# Patient Record
Sex: FEMALE | Race: WHITE | NOT HISPANIC OR LATINO
[De-identification: names, ages, dates, MRNs, and addresses within clinical notes are randomized per-mention and may not be internally consistent; named-entity substitution may affect disease eponyms.]

---

## 2021-02-16 ENCOUNTER — APPOINTMENT (OUTPATIENT)
Dept: ANTEPARTUM | Facility: CLINIC | Age: 21
End: 2021-02-16
Payer: MEDICAID

## 2021-02-16 ENCOUNTER — ASOB RESULT (OUTPATIENT)
Age: 21
End: 2021-02-16

## 2021-02-16 PROCEDURE — 99072 ADDL SUPL MATRL&STAF TM PHE: CPT

## 2021-02-16 PROCEDURE — 76811 OB US DETAILED SNGL FETUS: CPT

## 2021-04-25 ENCOUNTER — RESULT REVIEW (OUTPATIENT)
Age: 21
End: 2021-04-25

## 2021-04-25 ENCOUNTER — OUTPATIENT (OUTPATIENT)
Dept: OUTPATIENT SERVICES | Facility: HOSPITAL | Age: 21
LOS: 1 days | Discharge: HOME | End: 2021-04-25
Payer: MEDICAID

## 2021-04-25 VITALS
RESPIRATION RATE: 20 BRPM | DIASTOLIC BLOOD PRESSURE: 63 MMHG | TEMPERATURE: 98 F | HEART RATE: 92 BPM | SYSTOLIC BLOOD PRESSURE: 120 MMHG

## 2021-04-25 VITALS — SYSTOLIC BLOOD PRESSURE: 120 MMHG | DIASTOLIC BLOOD PRESSURE: 63 MMHG | HEART RATE: 120 BPM

## 2021-04-25 LAB
ALBUMIN SERPL ELPH-MCNC: 3.9 G/DL — SIGNIFICANT CHANGE UP (ref 3.5–5.2)
ALP SERPL-CCNC: 132 U/L — HIGH (ref 30–115)
ALT FLD-CCNC: 47 U/L — HIGH (ref 14–37)
AMYLASE P1 CFR SERPL: 67 U/L — SIGNIFICANT CHANGE UP (ref 25–115)
ANION GAP SERPL CALC-SCNC: 12 MMOL/L — SIGNIFICANT CHANGE UP (ref 7–14)
APPEARANCE UR: ABNORMAL
AST SERPL-CCNC: 28 U/L — SIGNIFICANT CHANGE UP (ref 14–37)
BACTERIA # UR AUTO: ABNORMAL
BASOPHILS # BLD AUTO: 0.02 K/UL — SIGNIFICANT CHANGE UP (ref 0–0.2)
BASOPHILS NFR BLD AUTO: 0.1 % — SIGNIFICANT CHANGE UP (ref 0–1)
BILIRUB SERPL-MCNC: 0.4 MG/DL — SIGNIFICANT CHANGE UP (ref 0.2–1.2)
BILIRUB UR-MCNC: NEGATIVE — SIGNIFICANT CHANGE UP
BLD GP AB SCN SERPL QL: SIGNIFICANT CHANGE UP
BUN SERPL-MCNC: 5 MG/DL — LOW (ref 10–20)
CALCIUM SERPL-MCNC: 9.3 MG/DL — SIGNIFICANT CHANGE UP (ref 8.5–10.1)
CHLORIDE SERPL-SCNC: 101 MMOL/L — SIGNIFICANT CHANGE UP (ref 98–110)
CO2 SERPL-SCNC: 21 MMOL/L — SIGNIFICANT CHANGE UP (ref 17–32)
COLOR SPEC: YELLOW — SIGNIFICANT CHANGE UP
CREAT SERPL-MCNC: 0.5 MG/DL — SIGNIFICANT CHANGE UP (ref 0.3–1)
DIFF PNL FLD: NEGATIVE — SIGNIFICANT CHANGE UP
EOSINOPHIL # BLD AUTO: 0 K/UL — SIGNIFICANT CHANGE UP (ref 0–0.7)
EOSINOPHIL NFR BLD AUTO: 0 % — SIGNIFICANT CHANGE UP (ref 0–8)
EPI CELLS # UR: >27 /HPF — HIGH (ref 0–5)
GLUCOSE SERPL-MCNC: 94 MG/DL — SIGNIFICANT CHANGE UP (ref 70–99)
GLUCOSE UR QL: ABNORMAL
HCT VFR BLD CALC: 33.1 % — LOW (ref 37–47)
HGB BLD-MCNC: 11.3 G/DL — LOW (ref 12–16)
HYALINE CASTS # UR AUTO: 19 /LPF — HIGH (ref 0–7)
IMM GRANULOCYTES NFR BLD AUTO: 0.5 % — HIGH (ref 0.1–0.3)
KETONES UR-MCNC: ABNORMAL
LEUKOCYTE ESTERASE UR-ACNC: NEGATIVE — SIGNIFICANT CHANGE UP
LIDOCAIN IGE QN: 18 U/L — SIGNIFICANT CHANGE UP (ref 7–60)
LYMPHOCYTES # BLD AUTO: 1.19 K/UL — LOW (ref 1.2–3.4)
LYMPHOCYTES # BLD AUTO: 7.9 % — LOW (ref 20.5–51.1)
MCHC RBC-ENTMCNC: 31.1 PG — HIGH (ref 27–31)
MCHC RBC-ENTMCNC: 34.1 G/DL — SIGNIFICANT CHANGE UP (ref 32–37)
MCV RBC AUTO: 91.2 FL — SIGNIFICANT CHANGE UP (ref 81–99)
MONOCYTES # BLD AUTO: 0.51 K/UL — SIGNIFICANT CHANGE UP (ref 0.1–0.6)
MONOCYTES NFR BLD AUTO: 3.4 % — SIGNIFICANT CHANGE UP (ref 1.7–9.3)
NEUTROPHILS # BLD AUTO: 13.33 K/UL — HIGH (ref 1.4–6.5)
NEUTROPHILS NFR BLD AUTO: 88.1 % — HIGH (ref 42.2–75.2)
NITRITE UR-MCNC: NEGATIVE — SIGNIFICANT CHANGE UP
NRBC # BLD: 0 /100 WBCS — SIGNIFICANT CHANGE UP (ref 0–0)
PH UR: 6.5 — SIGNIFICANT CHANGE UP (ref 5–8)
PLATELET # BLD AUTO: 267 K/UL — SIGNIFICANT CHANGE UP (ref 130–400)
POTASSIUM SERPL-MCNC: 3.8 MMOL/L — SIGNIFICANT CHANGE UP (ref 3.5–5)
POTASSIUM SERPL-SCNC: 3.8 MMOL/L — SIGNIFICANT CHANGE UP (ref 3.5–5)
PROT SERPL-MCNC: 6.8 G/DL — SIGNIFICANT CHANGE UP (ref 6.1–8)
PROT UR-MCNC: ABNORMAL
RBC # BLD: 3.63 M/UL — LOW (ref 4.2–5.4)
RBC # FLD: 12.5 % — SIGNIFICANT CHANGE UP (ref 11.5–14.5)
RBC CASTS # UR COMP ASSIST: 6 /HPF — HIGH (ref 0–4)
SODIUM SERPL-SCNC: 134 MMOL/L — LOW (ref 135–146)
SP GR SPEC: 1.03 — HIGH (ref 1.01–1.03)
UROBILINOGEN FLD QL: SIGNIFICANT CHANGE UP
WBC # BLD: 15.13 K/UL — HIGH (ref 4.8–10.8)
WBC # FLD AUTO: 15.13 K/UL — HIGH (ref 4.8–10.8)
WBC UR QL: 3 /HPF — SIGNIFICANT CHANGE UP (ref 0–5)

## 2021-04-25 PROCEDURE — 99213 OFFICE O/P EST LOW 20 MIN: CPT | Mod: 25

## 2021-04-25 PROCEDURE — 76705 ECHO EXAM OF ABDOMEN: CPT | Mod: 26

## 2021-04-25 PROCEDURE — 76818 FETAL BIOPHYS PROFILE W/NST: CPT | Mod: 26

## 2021-04-25 RX ORDER — SODIUM CHLORIDE 9 MG/ML
1000 INJECTION, SOLUTION INTRAVENOUS ONCE
Refills: 0 | Status: COMPLETED | OUTPATIENT
Start: 2021-04-25 | End: 2021-04-25

## 2021-04-25 RX ORDER — INDOMETHACIN 50 MG
50 CAPSULE ORAL ONCE
Refills: 0 | Status: COMPLETED | OUTPATIENT
Start: 2021-04-25 | End: 2021-04-25

## 2021-04-25 RX ORDER — INDOMETHACIN 50 MG
1 CAPSULE ORAL
Qty: 8 | Refills: 0
Start: 2021-04-25 | End: 2021-04-26

## 2021-04-25 RX ORDER — FAMOTIDINE 10 MG/ML
20 INJECTION INTRAVENOUS ONCE
Refills: 0 | Status: COMPLETED | OUTPATIENT
Start: 2021-04-25 | End: 2021-04-25

## 2021-04-25 RX ORDER — ACETAMINOPHEN 500 MG
1000 TABLET ORAL ONCE
Refills: 0 | Status: COMPLETED | OUTPATIENT
Start: 2021-04-25 | End: 2021-04-25

## 2021-04-25 RX ADMIN — Medication 50 MILLIGRAM(S): at 15:16

## 2021-04-25 RX ADMIN — Medication 400 MILLIGRAM(S): at 13:19

## 2021-04-25 RX ADMIN — FAMOTIDINE 20 MILLIGRAM(S): 10 INJECTION INTRAVENOUS at 10:17

## 2021-04-25 RX ADMIN — SODIUM CHLORIDE 1000 MILLILITER(S): 9 INJECTION, SOLUTION INTRAVENOUS at 10:30

## 2021-04-25 NOTE — PROGRESS NOTE ADULT - ASSESSMENT
21yo  @30w2d, GBS pos bacteruria, with abdominal pain likely secondary to cholelithiasis, not in   labor, BPP 10/10, currently clinically and hemodynamically stable.  -will give indomethicin 50mg PO for pain management  -if pain is resolved, can d/c home with indomethicin 25mg x2 days  -regular diet  -cont efm/toco  -f/u ucx    Dr. Crum and Dr. Reza aware.

## 2021-04-25 NOTE — OB PROVIDER TRIAGE NOTE - NSHPLABSRESULTS_GEN_ALL_CORE
Labs:  11/12  ucx GBS pos, E faecalis pos  GC/CT neg  HBsAg NR  RPR NR  Measles immune  varicella immune  rubella immune  mumps immune  A pos, antibody neg  HIV NR    12/09  ucx GBS pos    4/10  GCT 86    Sono:  2/16: 20w3d, EFW 376g (65%), cephalic, post placenta, CL 3.61cm, normal anatomy

## 2021-04-25 NOTE — OB PROVIDER TRIAGE NOTE - NSANTENATALSTERA_OBGYN_ALL_OB
Detail Level: Zone
Include Location In Plan?: No
Detail Level: Simple
Not applicable as gestational age is greater than or equal to 34 weeks.

## 2021-04-25 NOTE — CHART NOTE - NSCHARTNOTEFT_GEN_A_CORE
Patient re-evaluated at bedside after indomethicin.  Pain is slightly improved.  Denies contractions, vaginal bleeding, LOF.  Good fetal movement.  Desires d/c home.    EFM:v 140/mod/+accel  Rosa Sanchez: none  SVE: 1/0/-3 @1404    A/P: 21yo  @30w2d, GBS pos bacteruria, with abdominal pain likely secondary to cholelithiasis, not in   labor, BPP 10/10, maternal and fetal wellbeing reassuring.  -home with indomethicin 25mg q6h x 2days  - labor precautions discussed  -PO hydration encouraged  -continue home prilosec and colace  -pain management with tylenol prn  -follow up with PMD this week    Dr. Crum and Dr. Reza aware.

## 2021-04-25 NOTE — OB PROVIDER TRIAGE NOTE - HISTORY OF PRESENT ILLNESS
19yo  @30w2d with COLLETTE / by LMP  consistent with first trimester sonogram per patient presenting to L&D for periumbilical and epigastric pain since yesterday @1600.  Pain was sudden onset, nonradiating, constant and sharp. 21yo  @30w2d with COLLETTE 7/2 by first trimester sonogram presenting to L&D for periumbilical and epigastric pain since yesterday @1600.  Pain was sudden onset, nonradiating, constant and sharp. Not relieved with tylenol or prilosec at home.  Reports 1 episode of vomiting around 2200 yesterday, currently denies nausea.  Has a history of constipation on colace, denies any recent diarrhea.  Denies sick contacts. Denies fevers, chills, cramping, vaginal discharge, dysuria, hematuria, leg pain/swelling.  Denies vaginal bleeding, leakage of fluid, contractions.  Good fetal movement.  Denies any complications with this pregnancy. GBS pos bacteruria. 19yo  @30w2d with COLLETTE 7/2 by first trimester sonogram presenting to L&D for periumbilical and epigastric pain since yesterday @1600.  Pain was sudden onset, nonradiating, constant and sharp. Not relieved with tylenol or prilosec at home.  Reports 1 episode of vomiting around 2200 yesterday, currently denies nausea.  Has a history of constipation on colace, denies any recent diarrhea.  Denies sick contacts. Denies fevers, chills, cramping, vaginal discharge, dysuria, hematuria, leg pain/swelling.  Denies vaginal bleeding, leakage of fluid, contractions.  Good fetal movement.  Denies any complications with this pregnancy. Last PO intake yesterday @, last BM this AM, last intercourse last week. GBS pos bacteruria.

## 2021-04-25 NOTE — OB PROVIDER TRIAGE NOTE - NSOBPROVIDERNOTE_OBGYN_ALL_OB_FT
19yo  @30w2d, GBS pos bacteruria, with abdominal pain r/o cholelithiasis vs. pancreatitis vs.  labor, BPP 10/10, currently clinically stable.  -cont efm/toco  -clear liquid diet  -pepcid 20mg IVP  -f/u upper abdomen ultrasound  -f/u CBC, CMP, lipase, amylase, RPR, HIV, T&S  -f/u UA, ucx    Dr. Crum and Dr. Reza aware.

## 2021-04-25 NOTE — PROGRESS NOTE ADULT - SUBJECTIVE AND OBJECTIVE BOX
PGY 2 Note    Patient seen at bedside for evaluation.  Reports no change in pain with IV tylenol or pepcid.  Patient examined at @1200 by Dr. Reza, SVE /-3 at that time.  Denies contractions or cramping, continues to report epigastric pain.  No other complaints.    Vital Signs Last 24 Hrs  T(C): 36.6 (2021 12:06), Max: 36.9 (2021 11:18)  T(F): 97.88 (2021 12:06), Max: 98.42 (2021 11:18)  HR: 92 (2021 12:06) (90 - 120)  BP: 120/63 (2021 12:06) (111/61 - 126/58)  RR: 20 (2021 12:06) (20 - 20)    EFM: 145/mod/+accel  TOCO: irregular  SVE: 1/-3 @1404    Labs:                        11.3   15.13 )-----------( 267      ( 2021 10:01 )             33.1         134<L>  |  101  |  5<L>  ----------------------------<  94  3.8   |  21  |  0.5    Ca    9.3      2021 10:01    TPro  6.8  /  Alb  3.9  /  TBili  0.4  /  DBili  x   /  AST  28  /  ALT  47<H>  /  AlkPhos  132<H>      ABO RH Interpretation: A POS (21 @ 10:01)    Urinalysis Basic - ( 2021 11:20 )    Color: Yellow / Appearance: Slightly Turbid / S.034 / pH: x  Gluc: x / Ketone: Large  / Bili: Negative / Urobili: <2 mg/dL   Blood: x / Protein: 100 mg/dL / Nitrite: Negative   Leuk Esterase: Negative / RBC: 6 /HPF / WBC 3 /HPF   Sq Epi: x / Non Sq Epi: >27 /HPF / Bacteria: Few    Amylase, Serum Total (21 @ 10:01)    Amylase, Serum Total: 67 U/L    Lipase, Serum (21 @ 10:01)    Lipase, Serum: 18 U/L    Imaging:  < from: US Abdomen Limited (21 @ 11:15) >    EXAM:  US ABDOMEN LIMITED            PROCEDURE DATE:  2021            INTERPRETATION:  CLINICAL HISTORY / REASON FOR EXAM: Epigastric and periumbilical pain. Pregnant.    COMPARISON: None.    PROCEDURE: Ultrasound of the right upper quadrant was performed.    FINDINGS:    LIVER: Normal in contour and echogenicity measuring 16 cm in length.    GALLBLADDER/BILIARY TREE: Cholelithiasis and sludge. No wall thickening or pericholecystic fluid. Negative sonographic Obrien's sign. No intrahepatic biliary ductal dilatation. The common bile duct measures 2 mm, which is normal.    PANCREAS: The visualized portions are unremarkable. Remainder obscured by overlying bowel gas. Pancreatitis cannot be excluded on this imaging exam    KIDNEY: Measures 10 cm in length. Mild right hydronephrosis.    URINARY BLADDER: Contracted.    AORTA/IVC: Visualized proximal portions unremarkable.    ASCITES: None.    IMPRESSION:    Cholelithiasis and sludge. No evidence of acute cholecystitis.    Mild right hydronephrosis.    < end of copied text >          MEDICATIONS  (STANDING):  indomethacin 50 milliGRAM(s) Oral once    MEDICATIONS  (PRN):

## 2021-04-25 NOTE — OB PROVIDER TRIAGE NOTE - NSHPPHYSICALEXAM_GEN_ALL_CORE
Vital Signs Last 24 Hrs  T(C): 36.4 (25 Apr 2021 09:15), Max: 36.4 (25 Apr 2021 09:15)  T(F): 97.6 (25 Apr 2021 09:15), Max: 97.6 (25 Apr 2021 09:15)  HR: 90 (25 Apr 2021 10:08) (90 - 120) bedside manual pulse 88bpm  BP: 111/61 (25 Apr 2021 10:08) (111/61 - 120/63)  RR: 20 (25 Apr 2021 09:15) (20 - 20)    Gen: NAD, sitting comfortably  Abd: Mild periumbilical tenderness, gravid, soft, no palpable ctx, no suprapubic tenderness, no CVA tenderness  SVE: 0/0/-3, vtx, intact  EFM: 140/mod/+accels  Pillager: q5m  Sono: cephalic, post placenta, CL 3.61cm, BPP 8/8, MVP 7.78cm, EFW 1657 (58%)

## 2021-04-27 LAB
CULTURE RESULTS: SIGNIFICANT CHANGE UP
SPECIMEN SOURCE: SIGNIFICANT CHANGE UP

## 2021-06-21 VITALS — SYSTOLIC BLOOD PRESSURE: 124 MMHG | DIASTOLIC BLOOD PRESSURE: 76 MMHG | WEIGHT: 170 LBS

## 2021-06-27 ENCOUNTER — INPATIENT (INPATIENT)
Facility: HOSPITAL | Age: 21
LOS: 2 days | Discharge: HOME | End: 2021-06-30
Attending: OBSTETRICS & GYNECOLOGY | Admitting: OBSTETRICS & GYNECOLOGY
Payer: MEDICAID

## 2021-06-27 VITALS
DIASTOLIC BLOOD PRESSURE: 60 MMHG | RESPIRATION RATE: 20 BRPM | HEIGHT: 63 IN | WEIGHT: 164.91 LBS | HEART RATE: 67 BPM | TEMPERATURE: 99 F | SYSTOLIC BLOOD PRESSURE: 137 MMHG

## 2021-06-27 LAB
ALBUMIN SERPL ELPH-MCNC: 4 G/DL — SIGNIFICANT CHANGE UP (ref 3.5–5.2)
ALP SERPL-CCNC: 254 U/L — HIGH (ref 30–115)
ALT FLD-CCNC: 114 U/L — HIGH (ref 14–37)
ANION GAP SERPL CALC-SCNC: 12 MMOL/L — SIGNIFICANT CHANGE UP (ref 7–14)
APPEARANCE UR: CLEAR — SIGNIFICANT CHANGE UP
AST SERPL-CCNC: 78 U/L — HIGH (ref 14–37)
BACTERIA # UR AUTO: ABNORMAL
BASOPHILS # BLD AUTO: 0.02 K/UL — SIGNIFICANT CHANGE UP (ref 0–0.2)
BASOPHILS NFR BLD AUTO: 0.2 % — SIGNIFICANT CHANGE UP (ref 0–1)
BILIRUB SERPL-MCNC: 0.3 MG/DL — SIGNIFICANT CHANGE UP (ref 0.2–1.2)
BILIRUB UR-MCNC: NEGATIVE — SIGNIFICANT CHANGE UP
BLD GP AB SCN SERPL QL: SIGNIFICANT CHANGE UP
BUN SERPL-MCNC: 10 MG/DL — SIGNIFICANT CHANGE UP (ref 10–20)
CALCIUM SERPL-MCNC: 9 MG/DL — SIGNIFICANT CHANGE UP (ref 8.5–10.1)
CHLORIDE SERPL-SCNC: 106 MMOL/L — SIGNIFICANT CHANGE UP (ref 98–110)
CO2 SERPL-SCNC: 22 MMOL/L — SIGNIFICANT CHANGE UP (ref 17–32)
COLOR SPEC: YELLOW — SIGNIFICANT CHANGE UP
CREAT SERPL-MCNC: 0.6 MG/DL — LOW (ref 0.7–1.5)
DIFF PNL FLD: NEGATIVE — SIGNIFICANT CHANGE UP
EOSINOPHIL # BLD AUTO: 0.01 K/UL — SIGNIFICANT CHANGE UP (ref 0–0.7)
EOSINOPHIL NFR BLD AUTO: 0.1 % — SIGNIFICANT CHANGE UP (ref 0–8)
EPI CELLS # UR: 8 /HPF — HIGH (ref 0–5)
GLUCOSE SERPL-MCNC: 80 MG/DL — SIGNIFICANT CHANGE UP (ref 70–99)
GLUCOSE UR QL: NEGATIVE — SIGNIFICANT CHANGE UP
HCT VFR BLD CALC: 38 % — SIGNIFICANT CHANGE UP (ref 37–47)
HGB BLD-MCNC: 13 G/DL — SIGNIFICANT CHANGE UP (ref 12–16)
HYALINE CASTS # UR AUTO: 2 /LPF — SIGNIFICANT CHANGE UP (ref 0–7)
IMM GRANULOCYTES NFR BLD AUTO: 0.3 % — SIGNIFICANT CHANGE UP (ref 0.1–0.3)
KETONES UR-MCNC: NEGATIVE — SIGNIFICANT CHANGE UP
LEUKOCYTE ESTERASE UR-ACNC: ABNORMAL
LYMPHOCYTES # BLD AUTO: 2.38 K/UL — SIGNIFICANT CHANGE UP (ref 1.2–3.4)
LYMPHOCYTES # BLD AUTO: 27.3 % — SIGNIFICANT CHANGE UP (ref 20.5–51.1)
MCHC RBC-ENTMCNC: 31.3 PG — HIGH (ref 27–31)
MCHC RBC-ENTMCNC: 34.2 G/DL — SIGNIFICANT CHANGE UP (ref 32–37)
MCV RBC AUTO: 91.6 FL — SIGNIFICANT CHANGE UP (ref 81–99)
MONOCYTES # BLD AUTO: 0.51 K/UL — SIGNIFICANT CHANGE UP (ref 0.1–0.6)
MONOCYTES NFR BLD AUTO: 5.8 % — SIGNIFICANT CHANGE UP (ref 1.7–9.3)
NEUTROPHILS # BLD AUTO: 5.77 K/UL — SIGNIFICANT CHANGE UP (ref 1.4–6.5)
NEUTROPHILS NFR BLD AUTO: 66.3 % — SIGNIFICANT CHANGE UP (ref 42.2–75.2)
NITRITE UR-MCNC: NEGATIVE — SIGNIFICANT CHANGE UP
NRBC # BLD: 0 /100 WBCS — SIGNIFICANT CHANGE UP (ref 0–0)
PH UR: 6 — SIGNIFICANT CHANGE UP (ref 5–8)
PLATELET # BLD AUTO: 199 K/UL — SIGNIFICANT CHANGE UP (ref 130–400)
POTASSIUM SERPL-MCNC: 4.3 MMOL/L — SIGNIFICANT CHANGE UP (ref 3.5–5)
POTASSIUM SERPL-SCNC: 4.3 MMOL/L — SIGNIFICANT CHANGE UP (ref 3.5–5)
PRENATAL SYPHILIS TEST: SIGNIFICANT CHANGE UP
PROT SERPL-MCNC: 6.4 G/DL — SIGNIFICANT CHANGE UP (ref 6–8)
PROT UR-MCNC: SIGNIFICANT CHANGE UP
RBC # BLD: 4.15 M/UL — LOW (ref 4.2–5.4)
RBC # FLD: 13.5 % — SIGNIFICANT CHANGE UP (ref 11.5–14.5)
RBC CASTS # UR COMP ASSIST: 1 /HPF — SIGNIFICANT CHANGE UP (ref 0–4)
SODIUM SERPL-SCNC: 140 MMOL/L — SIGNIFICANT CHANGE UP (ref 135–146)
SP GR SPEC: 1.02 — SIGNIFICANT CHANGE UP (ref 1.01–1.03)
UROBILINOGEN FLD QL: SIGNIFICANT CHANGE UP
WBC # BLD: 8.72 K/UL — SIGNIFICANT CHANGE UP (ref 4.8–10.8)
WBC # FLD AUTO: 8.72 K/UL — SIGNIFICANT CHANGE UP (ref 4.8–10.8)
WBC UR QL: 5 /HPF — SIGNIFICANT CHANGE UP (ref 0–5)

## 2021-06-27 RX ORDER — OXYTOCIN 10 UNIT/ML
333.33 VIAL (ML) INJECTION
Qty: 20 | Refills: 0 | Status: DISCONTINUED | OUTPATIENT
Start: 2021-06-27 | End: 2021-06-28

## 2021-06-27 RX ORDER — AMPICILLIN TRIHYDRATE 250 MG
1 CAPSULE ORAL EVERY 4 HOURS
Refills: 0 | Status: DISCONTINUED | OUTPATIENT
Start: 2021-06-27 | End: 2021-06-28

## 2021-06-27 RX ORDER — AMPICILLIN TRIHYDRATE 250 MG
2 CAPSULE ORAL ONCE
Refills: 0 | Status: COMPLETED | OUTPATIENT
Start: 2021-06-27 | End: 2021-06-27

## 2021-06-27 RX ORDER — SODIUM CHLORIDE 9 MG/ML
1000 INJECTION, SOLUTION INTRAVENOUS
Refills: 0 | Status: DISCONTINUED | OUTPATIENT
Start: 2021-06-27 | End: 2021-06-28

## 2021-06-27 RX ADMIN — Medication 216 GRAM(S): at 22:55

## 2021-06-27 NOTE — OB PROVIDER H&P - NSHPPHYSICALEXAM_GEN_ALL_CORE
T(C): 37 (06-27-21 @ 21:19), Max: 37 (06-27-21 @ 21:19)  HR: 81 (06-27-21 @ 22:55) (67 - 93)  BP: 124/66 (06-27-21 @ 22:55) (112/63 - 137/60)  RR: 20 (06-27-21 @ 21:19) (20 - 20)  BMI (kg/m2): 29.2 (06-27-21 @ 21:19)    Gen: A+OX3. NAD  Abd: Soft, Nontender. Gravid.  FHR: 140BPM/mod cristi/accels pos  TOCO: none  SVE:     EFW by Leopolds: T(C): 37 (06-27-21 @ 21:19), Max: 37 (06-27-21 @ 21:19)  HR: 81 (06-27-21 @ 22:55) (67 - 93)  BP: 124/66 (06-27-21 @ 22:55) (112/63 - 137/60)  RR: 20 (06-27-21 @ 21:19) (20 - 20)  BMI (kg/m2): 29.2 (06-27-21 @ 21:19)    Gen: A+OX3. NAD  Abd: Soft, Nontender. Gravid.  FHR: 140BPM/mod cristi/accels pos  TOCO: none  SVE: 1/60 vtx, intact per Dr. Clark    EFW by Leopolds: [id number] T(C): 37 (06-27-21 @ 21:19), Max: 37 (06-27-21 @ 21:19)  HR: 81 (06-27-21 @ 22:55) (67 - 93)  BP: 124/66 (06-27-21 @ 22:55) (112/63 - 137/60)  RR: 20 (06-27-21 @ 21:19) (20 - 20)  BMI (kg/m2): 29.2 (06-27-21 @ 21:19)    Gen: A+OX3. NAD  Abd: Soft, Nontender. Gravid.  FHR: 140BPM/mod cristi/accels pos  TOCO: none  SVE: 1/80/-3, vertex intact     EFW by Leopolds: 2800

## 2021-06-27 NOTE — OB PROVIDER H&P - HISTORY OF PRESENT ILLNESS
20y/o,  at 39w2d, date by LMP 20 COLLETTE 2021 , GBS pos, presents IOL due to elevated bile acids.     Denies vaginal bleeding, LOF, and CTX. Feels good fetal movements. No other complications this pregnancy.     22y/o,  at 39w2d, date by LMP 20 COLLETTE 2021 , GBS pos, presents IOL at term, with elevated bile acids. Total bile acids 14 on 2021.   Last SVE on , 1cm.     Denies vaginal bleeding, LOF, and CTX. Feels good fetal movements. No other complications this pregnancy.     22y/o,  at 39w2d, date by LMP 20 COLLETTE 2021 , GBS pos, presents IOL at term, complicated by cholestasis. Total bile acids 14 on 2021.   Last SVE on , 1cm.     Denies vaginal bleeding, LOF, and CTX. Feels good fetal movements. No other complications this pregnancy.     20y/o,  at 39w2d, date by LMP 20 COLLETTE 2021 , GBS pos, presents IOL at term, complicated by cholestasis. Total bile acids 14 on 2021.   Cholelithasis this pregnancy, passed CBD stone. Last SVE on , 1cm.     Denies vaginal bleeding, LOF, and CTX. Feels good fetal movements. No other complications this pregnancy.

## 2021-06-27 NOTE — OB PROVIDER H&P - ATTENDING COMMENTS
Pt seen and examined, Pt is a 20yo  at 39w2d, GBS bacteruria, with cholestasis, for IOL at term  -admit   -iv access  -iv abx for gbs prophylaxis   -Cervical balloon placed.   -analgesia prn

## 2021-06-27 NOTE — OB PROVIDER H&P - NSHPLABSRESULTS_GEN_ALL_CORE
Type and Screen: A pos  Antibody screen: neg   Rubella: immune  Varicella: immune   RPR: neg  HbSAg: neg  HIV: NR  Gonorrhea: neg   Chlamydia: neg    Second Trimester:  1 hour Glucola:  86    Third Trimester:  HIV: NR (6/2)    Urine Cx: pos for GBS (11/12/2020)    Sonogram:    20w3d posterior placenta, AFV normal, normal anatomy, , 65%  34w5d vtx, MVP 5.7,  BPP 8/8 Type and Screen: A pos  Antibody screen: neg   Rubella: immune  Varicella: immune   RPR: neg  HbSAg: neg  HIV: NR  Gonorrhea: neg   Chlamydia: neg    Second Trimester:  1 hour Glucola:  86    Third Trimester:  HIV: NR (6/2)    Urine Cx: pos for GBS (11/12/2020)    Bile acids 6/1  cholic acid 10.2 H  deoxycholic acid <0.5  chenodeoxycholic acid 4.1 H  total bile acids 14.3 H  Sonogram:    20w3d posterior placenta, AFV normal, normal anatomy, , 65%  34w5d vtx, MVP 5.7,  BPP 8/8 Type and Screen: A pos  Antibody screen: neg   Rubella: immune  Varicella: immune   RPR: neg  HbSAg: neg  HIV: NR  Gonorrhea: neg   Chlamydia: neg    Second Trimester:  1 hour Glucola:  86    Third Trimester:  HIV: NR (6/2)    Urine Cx: pos for GBS (11/12/2020)    Bile acids 6/1 total bile acids 14.3 H  AST/ALT 66/125    Sonogram:  20w3d posterior placenta, AFV normal, normal anatomy, , 65%  34w5d vtx, MVP 5.7,  BPP 8/8

## 2021-06-27 NOTE — OB PROVIDER H&P - ASSESSMENT
20yo  at 39w2d, GBS pos, for IOL due to elevated bile acids.  -Admit to L+D  -Monitor EFM and TOCO   -IVF and labs  -Pain control PRN  -Clear liquid diet as tolerated  -Pitocin for labor induction      Case discussed with Dr. Gleason, and Dr. Clark to be aware 20yo  at 39w2d, GBS bacteruria, with cholestasis, for IOL at term,  -Admit to L+D  -Monitor EFM and TOCO   -IVF and labs  -Pain control PRN  -Clear liquid diet as tolerated  -Pitocin for labor induction    Case discussed with Dr. Gleason, and Dr. Clark to be aware 22yo  at 39w2d, GBS bacteruria, with cholestasis, for IOL at term  -f/u CMP   -Admit to L+D  -Monitor EFM and TOCO   -IVF and labs  -Pain control PRN  -Clear liquid diet as tolerated  -Pitocin for labor induction    Case discussed with Dr. Gleason, and Dr. Clark to be aware

## 2021-06-28 PROBLEM — Z78.9 OTHER SPECIFIED HEALTH STATUS: Chronic | Status: ACTIVE | Noted: 2021-04-25

## 2021-06-28 LAB
AMPHET UR-MCNC: NEGATIVE — SIGNIFICANT CHANGE UP
BARBITURATES UR SCN-MCNC: NEGATIVE — SIGNIFICANT CHANGE UP
BENZODIAZ UR-MCNC: NEGATIVE — SIGNIFICANT CHANGE UP
BUPRENORPHINE SCREEN, URINE RESULT: NEGATIVE — SIGNIFICANT CHANGE UP
COCAINE METAB.OTHER UR-MCNC: NEGATIVE — SIGNIFICANT CHANGE UP
COVID-19 SPIKE DOMAIN AB INTERP: POSITIVE
COVID-19 SPIKE DOMAIN ANTIBODY RESULT: 245 U/ML — HIGH
FENTANYL UR QL: NEGATIVE — SIGNIFICANT CHANGE UP
L&D DRUG SCREEN, URINE: SIGNIFICANT CHANGE UP
METHADONE UR-MCNC: NEGATIVE — SIGNIFICANT CHANGE UP
OPIATES UR-MCNC: NEGATIVE — SIGNIFICANT CHANGE UP
OXYCODONE UR-MCNC: NEGATIVE — SIGNIFICANT CHANGE UP
PCP UR-MCNC: NEGATIVE — SIGNIFICANT CHANGE UP
PROPOXYPHENE QUALITATIVE URINE RESULT: NEGATIVE — SIGNIFICANT CHANGE UP
SARS-COV-2 IGG+IGM SERPL QL IA: 245 U/ML — HIGH
SARS-COV-2 IGG+IGM SERPL QL IA: POSITIVE
SARS-COV-2 RNA SPEC QL NAA+PROBE: SIGNIFICANT CHANGE UP

## 2021-06-28 PROCEDURE — 59400 OBSTETRICAL CARE: CPT | Mod: U9

## 2021-06-28 RX ORDER — SIMETHICONE 80 MG/1
80 TABLET, CHEWABLE ORAL EVERY 4 HOURS
Refills: 0 | Status: DISCONTINUED | OUTPATIENT
Start: 2021-06-28 | End: 2021-06-30

## 2021-06-28 RX ORDER — IBUPROFEN 200 MG
600 TABLET ORAL EVERY 6 HOURS
Refills: 0 | Status: DISCONTINUED | OUTPATIENT
Start: 2021-06-28 | End: 2021-06-30

## 2021-06-28 RX ORDER — OXYCODONE HYDROCHLORIDE 5 MG/1
5 TABLET ORAL ONCE
Refills: 0 | Status: DISCONTINUED | OUTPATIENT
Start: 2021-06-28 | End: 2021-06-30

## 2021-06-28 RX ORDER — IBUPROFEN 200 MG
600 TABLET ORAL EVERY 6 HOURS
Refills: 0 | Status: COMPLETED | OUTPATIENT
Start: 2021-06-28 | End: 2022-05-27

## 2021-06-28 RX ORDER — SODIUM CHLORIDE 9 MG/ML
3 INJECTION INTRAMUSCULAR; INTRAVENOUS; SUBCUTANEOUS EVERY 8 HOURS
Refills: 0 | Status: DISCONTINUED | OUTPATIENT
Start: 2021-06-28 | End: 2021-06-30

## 2021-06-28 RX ORDER — CARBOPROST TROMETHAMINE 250 UG/ML
250 INJECTION, SOLUTION INTRAMUSCULAR ONCE
Refills: 0 | Status: COMPLETED | OUTPATIENT
Start: 2021-06-28 | End: 2021-06-28

## 2021-06-28 RX ORDER — ONDANSETRON 8 MG/1
4 TABLET, FILM COATED ORAL EVERY 6 HOURS
Refills: 0 | Status: DISCONTINUED | OUTPATIENT
Start: 2021-06-28 | End: 2021-06-28

## 2021-06-28 RX ORDER — ACETAMINOPHEN 500 MG
975 TABLET ORAL
Refills: 0 | Status: DISCONTINUED | OUTPATIENT
Start: 2021-06-28 | End: 2021-06-30

## 2021-06-28 RX ORDER — BENZOCAINE 10 %
1 GEL (GRAM) MUCOUS MEMBRANE EVERY 6 HOURS
Refills: 0 | Status: DISCONTINUED | OUTPATIENT
Start: 2021-06-28 | End: 2021-06-30

## 2021-06-28 RX ORDER — OXYCODONE HYDROCHLORIDE 5 MG/1
5 TABLET ORAL
Refills: 0 | Status: DISCONTINUED | OUTPATIENT
Start: 2021-06-28 | End: 2021-06-30

## 2021-06-28 RX ORDER — FENTANYL/BUPIVACAINE/NS/PF 2MCG/ML-.1
250 PLASTIC BAG, INJECTION (ML) INJECTION
Refills: 0 | Status: DISCONTINUED | OUTPATIENT
Start: 2021-06-28 | End: 2021-06-28

## 2021-06-28 RX ORDER — DIPHENHYDRAMINE HCL 50 MG
25 CAPSULE ORAL EVERY 6 HOURS
Refills: 0 | Status: DISCONTINUED | OUTPATIENT
Start: 2021-06-28 | End: 2021-06-30

## 2021-06-28 RX ORDER — AER TRAVELER 0.5 G/1
1 SOLUTION RECTAL; TOPICAL EVERY 4 HOURS
Refills: 0 | Status: DISCONTINUED | OUTPATIENT
Start: 2021-06-28 | End: 2021-06-30

## 2021-06-28 RX ORDER — NALOXONE HYDROCHLORIDE 4 MG/.1ML
0.1 SPRAY NASAL
Refills: 0 | Status: DISCONTINUED | OUTPATIENT
Start: 2021-06-28 | End: 2021-06-28

## 2021-06-28 RX ORDER — OXYTOCIN 10 UNIT/ML
333.33 VIAL (ML) INJECTION
Qty: 20 | Refills: 0 | Status: DISCONTINUED | OUTPATIENT
Start: 2021-06-28 | End: 2021-06-30

## 2021-06-28 RX ORDER — KETOROLAC TROMETHAMINE 30 MG/ML
30 SYRINGE (ML) INJECTION ONCE
Refills: 0 | Status: DISCONTINUED | OUTPATIENT
Start: 2021-06-28 | End: 2021-06-30

## 2021-06-28 RX ORDER — LANOLIN
1 OINTMENT (GRAM) TOPICAL EVERY 6 HOURS
Refills: 0 | Status: DISCONTINUED | OUTPATIENT
Start: 2021-06-28 | End: 2021-06-30

## 2021-06-28 RX ORDER — OXYTOCIN 10 UNIT/ML
2 VIAL (ML) INJECTION
Qty: 30 | Refills: 0 | Status: DISCONTINUED | OUTPATIENT
Start: 2021-06-28 | End: 2021-06-28

## 2021-06-28 RX ORDER — DIBUCAINE 1 %
1 OINTMENT (GRAM) RECTAL EVERY 6 HOURS
Refills: 0 | Status: DISCONTINUED | OUTPATIENT
Start: 2021-06-28 | End: 2021-06-30

## 2021-06-28 RX ORDER — TETANUS TOXOID, REDUCED DIPHTHERIA TOXOID AND ACELLULAR PERTUSSIS VACCINE, ADSORBED 5; 2.5; 8; 8; 2.5 [IU]/.5ML; [IU]/.5ML; UG/.5ML; UG/.5ML; UG/.5ML
0.5 SUSPENSION INTRAMUSCULAR ONCE
Refills: 0 | Status: DISCONTINUED | OUTPATIENT
Start: 2021-06-28 | End: 2021-06-30

## 2021-06-28 RX ORDER — DEXAMETHASONE 0.5 MG/5ML
4 ELIXIR ORAL EVERY 6 HOURS
Refills: 0 | Status: DISCONTINUED | OUTPATIENT
Start: 2021-06-28 | End: 2021-06-28

## 2021-06-28 RX ORDER — HYDROCORTISONE 1 %
1 OINTMENT (GRAM) TOPICAL EVERY 6 HOURS
Refills: 0 | Status: DISCONTINUED | OUTPATIENT
Start: 2021-06-28 | End: 2021-06-30

## 2021-06-28 RX ORDER — MAGNESIUM HYDROXIDE 400 MG/1
30 TABLET, CHEWABLE ORAL
Refills: 0 | Status: DISCONTINUED | OUTPATIENT
Start: 2021-06-28 | End: 2021-06-30

## 2021-06-28 RX ORDER — PRAMOXINE HYDROCHLORIDE 150 MG/15G
1 AEROSOL, FOAM RECTAL EVERY 4 HOURS
Refills: 0 | Status: DISCONTINUED | OUTPATIENT
Start: 2021-06-28 | End: 2021-06-30

## 2021-06-28 RX ADMIN — Medication 108 GRAM(S): at 02:30

## 2021-06-28 RX ADMIN — Medication 108 GRAM(S): at 18:30

## 2021-06-28 RX ADMIN — Medication 600 MILLIGRAM(S): at 22:55

## 2021-06-28 RX ADMIN — SODIUM CHLORIDE 3 MILLILITER(S): 9 INJECTION INTRAMUSCULAR; INTRAVENOUS; SUBCUTANEOUS at 23:36

## 2021-06-28 RX ADMIN — Medication 108 GRAM(S): at 10:27

## 2021-06-28 RX ADMIN — Medication 2 MILLIUNIT(S)/MIN: at 07:35

## 2021-06-28 RX ADMIN — Medication 108 GRAM(S): at 06:30

## 2021-06-28 RX ADMIN — Medication 600 MILLIGRAM(S): at 22:23

## 2021-06-28 RX ADMIN — CARBOPROST TROMETHAMINE 250 MICROGRAM(S): 250 INJECTION, SOLUTION INTRAMUSCULAR at 20:17

## 2021-06-28 RX ADMIN — Medication 108 GRAM(S): at 14:29

## 2021-06-28 NOTE — PROGRESS NOTE ADULT - SUBJECTIVE AND OBJECTIVE BOX
PGY1    Patient seen and examined at bedside in NAD.     T(F): 98.6 (21:19)  HR: 88 (01:45)  BP: 120/58 (01:45)  RR: 20 (21:19)  EFM: 125BPM/mod cristi/no accels  TOCO: irregular  SVE: /-2    Medications:  ampicillin  IVPB: 216 mL/Hr (21 @ 22:55)      Labs:                        13.0   8.72  )-----------( 199      ( 2021 22:37 )             38.0         140  |  106  |  10  ----------------------------<  80  4.3   |  22  |  0.6<L>    Ca    9.0      2021 22:51    TPro  6.4  /  Alb  4.0  /  TBili  0.3  /  DBili  x   /  AST  78<H>  /  ALT  114<H>  /  AlkPhos  254<H>      ABO RH Interpretation: A POS (21 @ 22:37)    Urinalysis Basic - ( 2021 22:37 )    Color: Yellow / Appearance: Clear / S.019 / pH: x  Gluc: x / Ketone: Negative  / Bili: Negative / Urobili: <2 mg/dL   Blood: x / Protein: Trace / Nitrite: Negative   Leuk Esterase: Small / RBC: 1 /HPF / WBC 5 /HPF   Sq Epi: x / Non Sq Epi: 8 /HPF / Bacteria: Few               PGY1    Patient seen and examined at bedside, no acute complaints.     T(F): 98.6 (21:19)  HR: 88 (01:45)  BP: 120/58 (01:45)  RR: 20 (21:19)  EFM: 125BPM/mod cristi/no accels  TOCO: irregular  SVE: exam deferred, previous exam: /-2    Medications:  ampicillin  IVPB: 216 mL/Hr (21 @ 22:55)      Labs:                        13.0   8.72  )-----------( 199      ( 2021 22:37 )             38.0         140  |  106  |  10  ----------------------------<  80  4.3   |  22  |  0.6<L>    Ca    9.0      2021 22:51    TPro  6.4  /  Alb  4.0  /  TBili  0.3  /  DBili  x   /  AST  78<H>  /  ALT  114<H>  /  AlkPhos  254<H>      ABO RH Interpretation: A POS (21 @ 22:37)    Urinalysis Basic - ( 2021 22:37 )    Color: Yellow / Appearance: Clear / S.019 / pH: x  Gluc: x / Ketone: Negative  / Bili: Negative / Urobili: <2 mg/dL   Blood: x / Protein: Trace / Nitrite: Negative   Leuk Esterase: Small / RBC: 1 /HPF / WBC 5 /HPF   Sq Epi: x / Non Sq Epi: 8 /HPF / Bacteria: Few

## 2021-06-28 NOTE — OB PROVIDER DELIVERY SUMMARY - NSSELHIDDEN_OBGYN_ALL_OB_FT
[NS_DeliveryRN_OBGYN_ALL_OB_FT:[id number]],[NS_DeliveryAttending1_OBGYN_ALL_OB_FT:[id number]],[NS_CirculateRN2_OBGYN_ALL_OB_FT:[id number]]

## 2021-06-28 NOTE — PROCEDURE NOTE - NSANESMONIT_OBGYN_ALL_OB
Routine Monitoring
Non-Invasive Blood Pressure Monitoring/Routine Monitoring/Fetal Heart Rate Monitor

## 2021-06-28 NOTE — PROCEDURE NOTE - ADDITIONAL PROCEDURE DETAILS
21 y o primip for induction  ripening balloon placed by OB staff  's  maternal VSS  Pt given initial bolus epidural dose (see above) and then started on infusion of 0.1% bupivacaine with Fentanyl at 14 ml/hr  Pt comfortable  analgesia level T10 21 y o primip for induction  ripening balloon placed by OB staff  's  maternal VSS  Pt given initial bolus epidural dose (see above) and then started on infusion of 0.1% bupivacaine with Fentanyl at 14 ml/hr  Pt comfortable  analgesia level T10    REDOSE @ 1110  Pain: 7/10  V/E 6  Medication: Ropivacaine 0.5% 5cc/Lidocaine 2% 5cc  Given in increments after  -ve aspiration  VSS analgesia at T10 21 y o primip for induction  ripening balloon placed by OB staff  's  maternal VSS  Pt given initial bolus epidural dose (see above) and then started on infusion of 0.1% bupivacaine with Fentanyl at 14 ml/hr  Pt comfortable  analgesia level T10    REDOSE @ 1110  Pain: 7/10  V/E 6  Medication: Ropivacaine 0.5% 5cc/Lidocaine 2% 5cc  Given in increments after  -ve aspiration  VSS analgesia at T10        Patient reported lack of relief from prior epidural. On examination, prior epidural catheter appeared to have been dislodged. Prior epidural cathter removed and  new epidural catheter placed @ 1510. See corresponding procedure note for additional details

## 2021-06-28 NOTE — PROCEDURE NOTE - ADDITIONAL PROCEDURE DETAILS
Patient reported lack of relief from prior epidural. On examination, prior epidural catheter appeared to have been dislodged. Prior epidural cathter removed and  new epidural catheter placed @ 1310.       Lumbar epidural performed at L3-4. Standard ASA monitors including FHR. Sterile gloves, betadine prep. 1% lidocaine for local infiltration. 17g touhy. AR with air. epidural catheter threaded easily. Touhy needle removed. Catheter secured in place. Negative aspiration. Test dose consisting of 3ml 1.5% lidocaine with epinephrine was negative. 10ml 0.125% bupivacaine given incrementally after negative aspiration. Patient tolerated procedure and was hemodynamically stable throughout. T10 level bilaterally. Epidural infusion consisting of 200ml 0.1% bupivacaine w/ fentanyl 2mcg/ml at 14ml/hr. Patient reported lack of relief from prior epidural. On examination, prior epidural catheter appeared to have been dislodged. Prior epidural cathter removed and  new epidural catheter placed @ 1510.       Lumbar epidural performed at L3-4. Standard ASA monitors including FHR. Sterile gloves, betadine prep. 1% lidocaine for local infiltration. 17g touhy. AR with air. epidural catheter threaded easily. Touhy needle removed. Catheter secured in place. Negative aspiration. Test dose consisting of 3ml 1.5% lidocaine with epinephrine was negative. 10ml 0.125% bupivacaine given incrementally after negative aspiration. Patient tolerated procedure and was hemodynamically stable throughout. T10 level bilaterally. Epidural infusion consisting of 200ml 0.1% bupivacaine w/ fentanyl 2mcg/ml at 14ml/hr.

## 2021-06-28 NOTE — OB PROVIDER DELIVERY SUMMARY - NSPROVIDERDELIVERYNOTE_OBGYN_ALL_OB_FT
Pt delivered a viable male infant over 2nd degree laceration   placenta delivered intact and spont   pt stable   mild lochia   uterus firm   laceration repaired with 2-0 chromic suture   baby to reg nursery.

## 2021-06-28 NOTE — CHART NOTE - NSCHARTNOTEFT_GEN_A_CORE
PGY 3 Note    Cervical ripening balloon placed without difficulty, 80/80.  Will continue for 12hrs or prn.      Dr. Clark aware.

## 2021-06-28 NOTE — OB RN DELIVERY SUMMARY - NSSELHIDDEN_OBGYN_ALL_OB_FT
[NS_DeliveryRN_OBGYN_ALL_OB_FT:[id number]] [NS_DeliveryRN_OBGYN_ALL_OB_FT:[id number]],[NS_DeliveryAttending1_OBGYN_ALL_OB_FT:[id number]] [NS_DeliveryRN_OBGYN_ALL_OB_FT:[id number]],[NS_DeliveryAttending1_OBGYN_ALL_OB_FT:[id number]=],[NS_CirculateRN2_OBGYN_ALL_OB_FT:[id number]]

## 2021-06-28 NOTE — PRE-ANESTHESIA EVALUATION ADULT - NSANTHOSAYNRD_GEN_A_CORE
No. TRUNG screening performed.  STOP BANG Legend: 0-2 = LOW Risk; 3-4 = INTERMEDIATE Risk; 5-8 = HIGH Risk

## 2021-06-28 NOTE — PROCEDURE NOTE - NSSITEPREP_SKIN_A_CORE
povidone iodine (if allergic to chlorhexidine)/Adherence to aseptic technique: hand hygiene prior to donning barriers (gown, gloves), don cap and mask, sterile drape over patient
povidone iodine (if allergic to chlorhexidine)

## 2021-06-28 NOTE — OB PROVIDER IHI INDUCTION/AUGMENTATION NOTE - NS_CHECKALL_OBGYN_ALL_OB
[facility]    [address] [address]    Phone:  [phone]       Thank You for choosing us for your health care visit. We are glad to serve you and happy to provide you with this summary of your visit. Please help us to ensure we have accurate records. If you find anything that needs to be changed, please let our staff know as soon as possible.          Your Demographic Information     Patient Name Sex Estella Mercado Female 1981       Ethnic Group Patient Race    Not of  or  Origin White      Your Visit Details     Date & Time Provider Department    1/10/2017 2:30 PM Jann Joaquin MD [facility]      Your Upcoming Appointment*(Max 10)     Tuesday January 10, 2017  4:15 PM CST   Follow-Up Therapy with Kenia Mcmanus, PT   [facility] [place] ([facility])    [address]  [place] [address]   [phone]            Tuesday January 10, 2017  4:30 PM CST   Follow-Up Therapy with Kenia Mcmanus, PT   [facility] [place] ([facility])    [address]  [place] [address]   [phone]            2017  9:00 AM CST   Worker's Comp Follow Up Visit with Jerry Cisneros MD   [facility] ([facility])    [address] [address]   [phone]            2017  1:30 PM CST   Medicare Well Visit with Fernanda Cummings PA-C   [facility] ([facility], [address] )    [address] [address]   [phone]              Your Vitals Were     Smoking Status                   Never Smoker           Medications Prescribed or Re-Ordered Today     None      Your Current Medications Are        Disp Refills Start End    naproxen (NAPROSYN) 
500 MG tablet 30 tablet 0 1/4/2017     Sig - Route: Take 1 tablet by mouth 2 times daily (with meals). - Oral    Class: Eprescribe    ammonium lactate (AMLACTIN) 12 % cream 385 g 1 12/5/2016     Sig: Apply to affected area twice daily.    Class: Eprescribe    CETIRIZINE HCL PO        Sig - Route: Take 2 tablets by mouth daily. - Oral    Class: Historical Med    diphenhydrAMINE (BENADRYL) 25 MG capsule        Sig - Route: Take 25 mg by mouth every 6 hours as needed for Itching. - Oral    Class: Historical Med    ibuprofen (ADVIL) 200 MG tablet 60 tablet 5 3/22/2016     Sig - Route: Take 2 tablets by mouth every 8 hours as needed for Pain. Patient has intermittent pain and requires relief to perform her job - Oral    Elastic Bandages & Supports (LUMBAR BACK BRACE/SUPPORT PAD) Misc 1 each 0 2/16/2016     Sig - Route: 1 each daily as needed (back pain). - Does not apply      Allergies     Valium [Diazepam] DIZZINESS, VISUAL DISTURBANCE    Oxycodone DIZZINESS    Visual changes    Vicodin [Hydrocodone-acetaminophen] DIZZINESS    Imodium [Loperamide] DIZZINESS    Tizanidine DIZZINESS    Zithromax [Azithromycin Dihydrate] RASH    Mobic [Meloxicam] Other (See Comments)    Chest pain      Immunizations History as of 1/10/2017     Name Date    Depo-provera 12/30/2014, 10/7/2014, 7/16/2014, 4/22/2014, 1/29/2014, 11/6/2013, 8/16/2013, 5/24/2013  3:30 PM, 3/6/2013, 12/14/2012, 9/26/2012, 7/10/2012, 4/20/2012, 1/31/2012, 11/15/2011, 8/23/2011, 6/3/2011, 3/11/2011, 12/17/2010, 9/28/2010, 7/9/2010  2:43 PM, 4/16/2010, 1/27/2010, 11/10/2009  3:42 PM, 8/21/2009    Influenza 9/27/2016, 10/7/2015, 9/30/2014, 9/6/2013, 9/6/2013, 9/7/2012      Problem List as of 1/10/2017     Pain in limb    Reflex sympathetic dystrophy, unspecified    Menorrhagia    h/o cognitive delay    Cervicalgia    Headache(784.0)    TMJ tenderness    Anxiety    Closed fracture of metacarpal bone(s), site unspecified    Right foot pain    CRPS (complex regional pain 
syndrome), lower limb    Bilateral low back pain without sciatica    Sternum pain    Costochondritis    Contusion of right hip    Lumbar back sprain    Plantar fasciitis    Neck muscle spasm              Patient Instructions    BASIC CARE FOR SKIN    The skin contains a high content of natural oils which keep it moist and supple. Under certain conditions, there can be less than normal amounts of oil, leaving the skin dry, flaky and itchy.  This often occurs in winter due to dry indoor heat. Aging skin is especially prone to this problem. Certain skin conditions such as eczema or psoriasis can be affected all year-round. The following instruction may help minimize occurrences of dryness.  (My favorite products are in Bold print.)    TAKE A QUICK 5 TO 10 MINUTE SHOWER OR BATH. Water is a drying agent. It evaporates many oils off of the skin surface leaving dry, prune-like skin (Note: a longer bath or shower is okay as long as a moisturizer is applied within 2 to 3 minutes after bathing.)    KEEP THE WATER TEMPERATURE LUKEWARM OR TEPID.  Very warm or hot water removes even more of the skin oils and makes itchy skin more itchy. Cool water reduces itch.    WASH WITH MILD CLEANSERS. The best are Cetaphil Gentle Skin Cleanser liquid, Vanicream Bar, Dove Sensitive Skin Bar, Cetaphil bar, Purpose (pump or bar). AVOID drying and deodorant soaps such as Ivory, Zest, Dial, Coast or Nithya Spring.  USE A WASHCLOTH GENTLY.  Better yet, use your hands to wash.  TOWEL DRY BY PATTING GENTLY.  Do not rub your skin after shower or bath.    REPLACE LOST OILS WITH A MOISTURIZER (Most important). These work best if applied within 2-3 minutes after bath, shower or washing; AND overnight. Apply liberally and frequently.  Apply in a downward direction on arms and legs to avoid plugging hair follicles (from shoulders to hands, thighs to feet).   These are the main categories of moisturizers:  HEAVY --- very greasy, but work best at replacing 
Order was written/H&P was completed/Contractions pattern was reviewed/FHR was reviewed/Induction / Augmentation was discussed
oil. Good for overnight (with or without cotton gloves). Good for very dry arms or legs, but can be used anywhere and any time. Some examples are: Vaseline, Aquaphor, AmLactin Ultra (ask the pharmacist as it may not be on the shelf).  MEDIUM --- all-around good moisturizers. Easier to apply and absorb well into the skin. Some examples are:  Vanicream cream (ask the pharmacist as it may not be on the shelf), Cetaphil cream.   LIGHT --- good for the face. Also good for the hands during the day (apply after washing hands). Some examples are:  Vanicream lotion (ask the pharmacist as it may not be on the shelf), Cetaphil lotion (pump).  DAILY FACIAL MOISTURIZERS WITH SUNSCREEN ---These are also oil free.   Some examples are: Oil of Olay Complete UV Protectant, Cetaphil Daily Facial Moisturizer, Purpose Dual Treatment Moisturizer, Aveeno Facial sunscreens.    Some of the richer, slightly more heavy products are:  Vanicream sunscreens and sunblocks (ask the pharmacist as it may not be on the shelf) and the Eucerin facial products.  NOTE: The Vanicream products have less irritants and chemicals which will cause less stinging of the eyes.  DRINK PLENTY OF FLUIDS, AVOID ALCOHOL INTAKE (alcohol dehydrates the body) AND USE A HUMIDIFIER DURING WINTER. Use dye and fragrance-free detergents.    Please call if any questions:    Dr. Joaquin  [facility]  [phone]    CeraVe -- SA Renewing Cream   Apply to body daily as needed       
Order was written/H&P was completed/Contractions pattern was reviewed/FHR was reviewed/Induction / Augmentation was discussed

## 2021-06-28 NOTE — PROGRESS NOTE ADULT - ASSESSMENT
A/P:   21y at 39w3d GA, GBS pos, for IOL due to elevated bile acids  - GBS ppx  - cervical ripening balloon in place  - continuous toco/EFM  - pain management PRN, s/p epidural    Dr. Gleason aware, Dr. Clark to be made aware A/P:   21y at 39w3d GA, GBS pos, for IOL at term, with elevated bile acids  - ampicillin for GBS ppx  - cervical ripening balloon in place  - continuous toco/EFM  - pain management PRN, s/p epidural    Dr. Gleason aware, Dr. Clark to be made aware

## 2021-06-28 NOTE — OB PROVIDER IHI INDUCTION/AUGMENTATION NOTE - NS_OBIHIINDICATION_OBGYN_ALL_OB
Maternal Medical Complication
Failure to dilate/Failure to descend/Inadequate uterine contraction/Failureto dilate  Failure to descend  Inadequate uterine contraction

## 2021-06-29 LAB
ALBUMIN SERPL ELPH-MCNC: 3.3 G/DL — LOW (ref 3.5–5.2)
ALP SERPL-CCNC: 192 U/L — HIGH (ref 30–115)
ALT FLD-CCNC: 111 U/L — HIGH (ref 14–37)
ANION GAP SERPL CALC-SCNC: 13 MMOL/L — SIGNIFICANT CHANGE UP (ref 7–14)
AST SERPL-CCNC: 89 U/L — HIGH (ref 14–37)
BASOPHILS # BLD AUTO: 0.02 K/UL — SIGNIFICANT CHANGE UP (ref 0–0.2)
BASOPHILS NFR BLD AUTO: 0.1 % — SIGNIFICANT CHANGE UP (ref 0–1)
BILIRUB SERPL-MCNC: 0.4 MG/DL — SIGNIFICANT CHANGE UP (ref 0.2–1.2)
BUN SERPL-MCNC: 9 MG/DL — LOW (ref 10–20)
CALCIUM SERPL-MCNC: 8.8 MG/DL — SIGNIFICANT CHANGE UP (ref 8.5–10.1)
CHLORIDE SERPL-SCNC: 107 MMOL/L — SIGNIFICANT CHANGE UP (ref 98–110)
CO2 SERPL-SCNC: 21 MMOL/L — SIGNIFICANT CHANGE UP (ref 17–32)
CREAT SERPL-MCNC: 0.7 MG/DL — SIGNIFICANT CHANGE UP (ref 0.7–1.5)
EOSINOPHIL # BLD AUTO: 0.01 K/UL — SIGNIFICANT CHANGE UP (ref 0–0.7)
EOSINOPHIL NFR BLD AUTO: 0.1 % — SIGNIFICANT CHANGE UP (ref 0–8)
GLUCOSE SERPL-MCNC: 92 MG/DL — SIGNIFICANT CHANGE UP (ref 70–99)
HCT VFR BLD CALC: 31.4 % — LOW (ref 37–47)
HGB BLD-MCNC: 10.7 G/DL — LOW (ref 12–16)
IMM GRANULOCYTES NFR BLD AUTO: 0.7 % — HIGH (ref 0.1–0.3)
LDH SERPL L TO P-CCNC: 301 — HIGH (ref 50–242)
LYMPHOCYTES # BLD AUTO: 1.91 K/UL — SIGNIFICANT CHANGE UP (ref 1.2–3.4)
LYMPHOCYTES # BLD AUTO: 13.3 % — LOW (ref 20.5–51.1)
MCHC RBC-ENTMCNC: 31.3 PG — HIGH (ref 27–31)
MCHC RBC-ENTMCNC: 34.1 G/DL — SIGNIFICANT CHANGE UP (ref 32–37)
MCV RBC AUTO: 91.8 FL — SIGNIFICANT CHANGE UP (ref 81–99)
MONOCYTES # BLD AUTO: 0.84 K/UL — HIGH (ref 0.1–0.6)
MONOCYTES NFR BLD AUTO: 5.8 % — SIGNIFICANT CHANGE UP (ref 1.7–9.3)
NEUTROPHILS # BLD AUTO: 11.5 K/UL — HIGH (ref 1.4–6.5)
NEUTROPHILS NFR BLD AUTO: 80 % — HIGH (ref 42.2–75.2)
NRBC # BLD: 0 /100 WBCS — SIGNIFICANT CHANGE UP (ref 0–0)
PLATELET # BLD AUTO: 152 K/UL — SIGNIFICANT CHANGE UP (ref 130–400)
POTASSIUM SERPL-MCNC: 3.7 MMOL/L — SIGNIFICANT CHANGE UP (ref 3.5–5)
POTASSIUM SERPL-SCNC: 3.7 MMOL/L — SIGNIFICANT CHANGE UP (ref 3.5–5)
PROT SERPL-MCNC: 5.2 G/DL — LOW (ref 6–8)
RBC # BLD: 3.42 M/UL — LOW (ref 4.2–5.4)
RBC # FLD: 13.8 % — SIGNIFICANT CHANGE UP (ref 11.5–14.5)
SODIUM SERPL-SCNC: 141 MMOL/L — SIGNIFICANT CHANGE UP (ref 135–146)
URATE SERPL-MCNC: 6.6 MG/DL — SIGNIFICANT CHANGE UP (ref 2.5–7)
WBC # BLD: 14.38 K/UL — HIGH (ref 4.8–10.8)
WBC # FLD AUTO: 14.38 K/UL — HIGH (ref 4.8–10.8)

## 2021-06-29 RX ADMIN — SODIUM CHLORIDE 3 MILLILITER(S): 9 INJECTION INTRAMUSCULAR; INTRAVENOUS; SUBCUTANEOUS at 06:22

## 2021-06-29 RX ADMIN — Medication 600 MILLIGRAM(S): at 23:18

## 2021-06-29 RX ADMIN — Medication 1 SPRAY(S): at 00:23

## 2021-06-29 RX ADMIN — Medication 600 MILLIGRAM(S): at 12:00

## 2021-06-29 RX ADMIN — SODIUM CHLORIDE 3 MILLILITER(S): 9 INJECTION INTRAMUSCULAR; INTRAVENOUS; SUBCUTANEOUS at 14:38

## 2021-06-29 RX ADMIN — Medication 600 MILLIGRAM(S): at 11:26

## 2021-06-29 RX ADMIN — Medication 975 MILLIGRAM(S): at 03:39

## 2021-06-29 RX ADMIN — Medication 975 MILLIGRAM(S): at 14:35

## 2021-06-29 RX ADMIN — Medication 600 MILLIGRAM(S): at 06:20

## 2021-06-29 RX ADMIN — Medication 600 MILLIGRAM(S): at 06:55

## 2021-06-29 RX ADMIN — Medication 1 APPLICATION(S): at 00:23

## 2021-06-29 RX ADMIN — Medication 975 MILLIGRAM(S): at 09:15

## 2021-06-29 RX ADMIN — Medication 975 MILLIGRAM(S): at 04:15

## 2021-06-29 RX ADMIN — AER TRAVELER 1 APPLICATION(S): 0.5 SOLUTION RECTAL; TOPICAL at 00:23

## 2021-06-29 RX ADMIN — Medication 600 MILLIGRAM(S): at 17:51

## 2021-06-29 RX ADMIN — Medication 975 MILLIGRAM(S): at 15:05

## 2021-06-29 RX ADMIN — Medication 600 MILLIGRAM(S): at 18:20

## 2021-06-29 RX ADMIN — Medication 600 MILLIGRAM(S): at 23:50

## 2021-06-29 RX ADMIN — Medication 975 MILLIGRAM(S): at 08:45

## 2021-06-29 NOTE — PROGRESS NOTE ADULT - SUBJECTIVE AND OBJECTIVE BOX
OB attending  PPD #1    Pt doing well, pain well controlled. No overnight events, no acute complaints.    Ambulating: Yes  Voiding: Yes  Flatus: Yes  Bowel movements: Yes   Breast or bottle feeding: Breastfeeding  Diet: Regular    PAST MEDICAL & SURGICAL HISTORY:  No pertinent past medical history    No significant past surgical history        Physical Exam  Vital Signs Last 24 Hrs  T(C): 35.8 (2021 07:46), Max: 37.5 (2021 18:10)  T(F): 96.4 (2021 07:46), Max: 99.5 (2021 18:10)  HR: 67 (2021 07:46) (65 - 131)  BP: 106/70 (2021 07:46) (106/70 - 178/111)  BP(mean): --  RR: 18 (2021 07:46) (18 - 18)  SpO2: 53% (2021 19:44) (53% - 100%)  Gen: AAOx3, NAD  Abd: Soft, nontender, nondistended, BS+  Fundus: Firm, below umbilicus  Lochia: normal  Ext: No calf tenderness, no swelling    Labs:                        13.0   8.72  )-----------( 199      ( 2021 22:37 )             38.0         A/P:  s/p , PPD #1, doing well  - continue current management  anticipate d/c home

## 2021-06-30 ENCOUNTER — TRANSCRIPTION ENCOUNTER (OUTPATIENT)
Age: 21
End: 2021-06-30

## 2021-06-30 VITALS
DIASTOLIC BLOOD PRESSURE: 66 MMHG | TEMPERATURE: 97 F | HEART RATE: 66 BPM | SYSTOLIC BLOOD PRESSURE: 121 MMHG | RESPIRATION RATE: 18 BRPM

## 2021-06-30 RX ORDER — ACETAMINOPHEN 500 MG
3 TABLET ORAL
Qty: 0 | Refills: 0 | DISCHARGE
Start: 2021-06-30

## 2021-06-30 RX ORDER — IBUPROFEN 200 MG
1 TABLET ORAL
Qty: 0 | Refills: 0 | DISCHARGE
Start: 2021-06-30

## 2021-06-30 RX ADMIN — Medication 600 MILLIGRAM(S): at 11:17

## 2021-06-30 RX ADMIN — Medication 975 MILLIGRAM(S): at 08:50

## 2021-06-30 RX ADMIN — Medication 600 MILLIGRAM(S): at 11:50

## 2021-06-30 RX ADMIN — Medication 600 MILLIGRAM(S): at 06:50

## 2021-06-30 RX ADMIN — SIMETHICONE 80 MILLIGRAM(S): 80 TABLET, CHEWABLE ORAL at 06:29

## 2021-06-30 RX ADMIN — Medication 600 MILLIGRAM(S): at 06:06

## 2021-06-30 RX ADMIN — Medication 975 MILLIGRAM(S): at 08:18

## 2021-06-30 NOTE — DISCHARGE NOTE OB - HOSPITAL COURSE
21 @ 12:49    HPI:  22y/o,  at 39w2d, date by LMP 20 COLLETTE 2021 , GBS pos, presents IOL at term, complicated by cholestasis. Total bile acids 14 on 2021.   Cholelithasis this pregnancy, passed CBD stone. Last SVE on , 1cm.     Denies vaginal bleeding, LOF, and CTX. Feels good fetal movements. No other complications this pregnancy.     (2021 22:55)      PAST MEDICAL & SURGICAL HISTORY:  No pertinent past medical history    No significant past surgical history        POST PARTUM COURSE:   uncomplicated       LABS:                        10.7   14.38 )-----------( 152      ( 2021 11:20 )             31.4       21 @ 11:20      141  |  107  |  9<L>  ----------------------------<  92  3.7   |  21  |  0.7    21 @ 22:51      140  |  106  |  10  ----------------------------<  80  4.3   |  22  |  0.6<L>        Ca    8.8      2021 11:20  Ca    9.0      2021 22:51    TPro  5.2<L>  /  Alb  3.3<L>  /  TBili  0.4  /  DBili  x   /  AST  89<H>  /  ALT  111<H>  /  AlkPhos  192<H>  21 @ 11:20  TPro  6.4  /  Alb  4.0  /  TBili  0.3  /  DBili  x   /  AST  78<H>  /  ALT  114<H>  /  AlkPhos  254<H>  21 @ 22:51        Allergies    No Known Allergies    Intolerances

## 2021-06-30 NOTE — PROGRESS NOTE ADULT - SUBJECTIVE AND OBJECTIVE BOX
OB attending  PPD #2    Pt doing well, pain well controlled. No overnight events, no acute complaints.    Ambulating: Yes  Voiding: Yes  Flatus: Yes  Bowel movements: Yes   Breast or bottle feeding: Breastfeeding  Diet: Regular    PAST MEDICAL & SURGICAL HISTORY:  No pertinent past medical history    No significant past surgical history        Physical Exam  Vital Signs Last 24 Hrs  T(C): 36.3 (2021 11:01), Max: 36.3 (2021 23:45)  T(F): 97.4 (2021 11:01), Max: 97.4 (2021 23:45)  HR: 66 (2021 11:01) (66 - 85)  BP: 121/66 (2021 11:01) (113/65 - 122/72)  RR: 18 (2021 11:01) (18 - 18)  Gen: AAOx3, NAD  Abd: Soft, nontender, nondistended, BS+  Fundus: Firm, below umbilicus  Lochia: normal  Ext: No calf tenderness, no swelling    Labs:                        10.7   14.38 )-----------( 152      ( 2021 11:20 )             31.4   Rh+      A/P :s/p , PPD #2, was IOL for cholestasis, met criteris for Ghtn , not severe range, now bp's normal, , doing well.   - continue current management  -d/c home with precautions  -f/u 4-6 wks for pp exam

## 2021-06-30 NOTE — DISCHARGE NOTE OB - CARE PROVIDER_API CALL
Hernan Kat)  Obstetrics and Gynecology  [address] [place] [address]  Phone: [phone]  Fax: [phone]  Follow Up Time:

## 2021-06-30 NOTE — DISCHARGE NOTE OB - PATIENT PORTAL LINK FT
You can access the FollowMyHealth Patient Portal offered by [facility] by registering at the following website: [email or web address]. By joining [facility]’s FollowMyHealth portal, you will also be able to view your health information using other applications (apps) compatible with our system.

## 2021-07-07 DIAGNOSIS — Z3A.39 39 WEEKS GESTATION OF PREGNANCY: ICD-10-CM

## 2021-07-07 DIAGNOSIS — O36.8330 MATERNAL CARE FOR ABNORMALITIES OF THE FETAL HEART RATE OR RHYTHM, THIRD TRIMESTER, NOT APPLICABLE OR UNSPECIFIED: ICD-10-CM

## 2021-07-07 DIAGNOSIS — K83.1 OBSTRUCTION OF BILE DUCT: ICD-10-CM

## 2021-07-07 DIAGNOSIS — O32.4XX0 MATERNAL CARE FOR HIGH HEAD AT TERM, NOT APPLICABLE OR UNSPECIFIED: ICD-10-CM

## 2022-03-14 NOTE — OB RN TRIAGE NOTE - HEART RATE (BEATS/MIN)
120 [Arthralgias] : arthralgias [As Noted in HPI] : as noted in HPI [Negative] : Heme/Lymph [Feeling Poorly] : not feeling poorly [FreeTextEntry1] : 2569 [Feeling Tired] : not feeling tired [FreeTextEntry9] : "arthritis..." [de-identified] : "tremor..."

## 2022-08-05 ENCOUNTER — NON-APPOINTMENT (OUTPATIENT)
Age: 22
End: 2022-08-05

## 2022-08-05 DIAGNOSIS — Z78.9 OTHER SPECIFIED HEALTH STATUS: ICD-10-CM

## 2022-08-05 RX ORDER — NORGESTREL AND ETHINYL ESTRADIOL 0.3-0.03MG
0.3-3 KIT ORAL
Refills: 0 | Status: ACTIVE | COMMUNITY

## 2022-08-18 ENCOUNTER — LABORATORY RESULT (OUTPATIENT)
Age: 22
End: 2022-08-18

## 2022-08-18 ENCOUNTER — APPOINTMENT (OUTPATIENT)
Dept: OBGYN | Facility: CLINIC | Age: 22
End: 2022-08-18

## 2022-08-18 VITALS — WEIGHT: 163 LBS | DIASTOLIC BLOOD PRESSURE: 76 MMHG | SYSTOLIC BLOOD PRESSURE: 131 MMHG

## 2022-08-18 DIAGNOSIS — Z80.3 FAMILY HISTORY OF MALIGNANT NEOPLASM OF BREAST: ICD-10-CM

## 2022-08-18 DIAGNOSIS — Z00.00 ENCOUNTER FOR GENERAL ADULT MEDICAL EXAMINATION W/OUT ABNORMAL FINDINGS: ICD-10-CM

## 2022-08-18 LAB
BILIRUB UR QL STRIP: NORMAL
GLUCOSE UR-MCNC: NORMAL
HCG UR QL: 0.2 EU/DL
HCG UR QL: NEGATIVE
HGB UR QL STRIP.AUTO: NORMAL
KETONES UR-MCNC: NORMAL
LEUKOCYTE ESTERASE UR QL STRIP: NORMAL
NITRITE UR QL STRIP: NORMAL
PH UR STRIP: 6.5
PROT UR STRIP-MCNC: NORMAL
SP GR UR STRIP: 1.02

## 2022-08-18 PROCEDURE — 81003 URINALYSIS AUTO W/O SCOPE: CPT | Mod: QW

## 2022-08-18 PROCEDURE — 81025 URINE PREGNANCY TEST: CPT

## 2022-08-18 PROCEDURE — 99395 PREV VISIT EST AGE 18-39: CPT

## 2022-08-18 RX ORDER — NORETHINDRONE ACETATE AND ETHINYL ESTRADIOL 1; 20 MG/1; UG/1
1-20 TABLET ORAL DAILY
Qty: 1 | Refills: 11 | Status: ACTIVE | COMMUNITY
Start: 2022-08-18 | End: 1900-01-01

## 2022-08-18 NOTE — HISTORY OF PRESENT ILLNESS
[FreeTextEntry1] : 23yo P1 here for annual. no compaltins.\par \par pmh denies\par psh denies\par meds none\par all nkda\par \par obhx: nsd x 1

## 2023-01-01 NOTE — OB PROVIDER TRIAGE NOTE - ADDITIONAL INSTRUCTIONS
No If you notice bright red blood enough to soak a pad, a large gush of fluid or continuous leakage of fluid, or decreased fetal movement please come to labor and delivery.  Fetal kick counts should be monitored, and 5-6 kicks should be felt per hour.  If less, please call your doctor.  Please follow up at your regularly scheduled prenatal appointment.

## 2023-05-16 ENCOUNTER — APPOINTMENT (OUTPATIENT)
Dept: OBGYN | Facility: CLINIC | Age: 23
End: 2023-05-16
Payer: COMMERCIAL

## 2023-05-16 VITALS
HEIGHT: 63 IN | DIASTOLIC BLOOD PRESSURE: 71 MMHG | WEIGHT: 160 LBS | BODY MASS INDEX: 28.35 KG/M2 | SYSTOLIC BLOOD PRESSURE: 108 MMHG

## 2023-05-16 DIAGNOSIS — R10.2 PELVIC AND PERINEAL PAIN: ICD-10-CM

## 2023-05-16 LAB
BILIRUB UR QL STRIP: NORMAL
GLUCOSE UR-MCNC: NORMAL
HCG UR QL: 0.2 EU/DL
HCG UR QL: NEGATIVE
HGB UR QL STRIP.AUTO: NORMAL
KETONES UR-MCNC: NORMAL
LEUKOCYTE ESTERASE UR QL STRIP: NORMAL
NITRITE UR QL STRIP: NORMAL
PH UR STRIP: 7
PROT UR STRIP-MCNC: NORMAL
SP GR UR STRIP: 1.01

## 2023-05-16 PROCEDURE — 99213 OFFICE O/P EST LOW 20 MIN: CPT

## 2023-05-16 PROCEDURE — 81025 URINE PREGNANCY TEST: CPT

## 2023-05-16 PROCEDURE — 81003 URINALYSIS AUTO W/O SCOPE: CPT | Mod: QW

## 2023-05-16 RX ORDER — CLOTRIMAZOLE AND BETAMETHASONE DIPROPIONATE 10; .5 MG/ML; MG/ML
1-0.05 LOTION TOPICAL TWICE DAILY
Qty: 1 | Refills: 0 | Status: ACTIVE | COMMUNITY
Start: 2023-05-16 | End: 1900-01-01

## 2023-05-16 NOTE — HISTORY OF PRESENT ILLNESS
[FreeTextEntry1] : pt here for post-coital vulvar irritation. reports after intercourse has pain externally near labia. no lesions, no hx of HSV. minimal discharge. no f/c/nv/.

## 2023-05-20 LAB
A VAGINAE DNA VAG QL NAA+PROBE: NORMAL
BVAB2 DNA VAG QL NAA+PROBE: NORMAL
C KRUSEI DNA VAG QL NAA+PROBE: NEGATIVE
CANDIDA DNA VAG QL NAA+PROBE: NEGATIVE
MEGA1 DNA VAG QL NAA+PROBE: NORMAL
T VAGINALIS RRNA SPEC QL NAA+PROBE: NEGATIVE

## 2023-09-19 DIAGNOSIS — Z78.9 OTHER SPECIFIED HEALTH STATUS: ICD-10-CM

## 2023-09-19 RX ORDER — NORETHINDRONE ACETATE AND ETHINYL ESTRADIOL 1; 20 MG/1; UG/1
1-20 TABLET ORAL
Qty: 1 | Refills: 1 | Status: ACTIVE | COMMUNITY
Start: 2023-09-19 | End: 1900-01-01

## 2023-10-12 ENCOUNTER — APPOINTMENT (OUTPATIENT)
Dept: OBGYN | Facility: CLINIC | Age: 23
End: 2023-10-12
Payer: COMMERCIAL

## 2023-10-12 VITALS — SYSTOLIC BLOOD PRESSURE: 121 MMHG | WEIGHT: 163 LBS | DIASTOLIC BLOOD PRESSURE: 70 MMHG

## 2023-10-12 DIAGNOSIS — Z01.419 ENCOUNTER FOR GYNECOLOGICAL EXAMINATION (GENERAL) (ROUTINE) W/OUT ABNORMAL FINDINGS: ICD-10-CM

## 2023-10-12 LAB
BILIRUB UR QL STRIP: NORMAL
GLUCOSE UR-MCNC: NORMAL
HCG UR QL: 0.2 EU/DL
HCG UR QL: NEGATIVE
HGB UR QL STRIP.AUTO: NORMAL
KETONES UR-MCNC: NORMAL
LEUKOCYTE ESTERASE UR QL STRIP: NORMAL
NITRITE UR QL STRIP: NORMAL
PH UR STRIP: 7.5
PROT UR STRIP-MCNC: NORMAL
SP GR UR STRIP: 1.02

## 2023-10-12 PROCEDURE — 81003 URINALYSIS AUTO W/O SCOPE: CPT | Mod: QW

## 2023-10-12 PROCEDURE — 81025 URINE PREGNANCY TEST: CPT

## 2023-10-12 PROCEDURE — 99395 PREV VISIT EST AGE 18-39: CPT

## 2023-10-12 RX ORDER — NORETHINDRONE ACETATE AND ETHINYL ESTRADIOL 1; 20 MG/1; UG/1
1-20 TABLET ORAL DAILY
Qty: 5 | Refills: 0 | Status: ACTIVE | COMMUNITY
Start: 2023-10-12 | End: 1900-01-01

## 2023-10-16 LAB
C TRACH RRNA SPEC QL NAA+PROBE: NOT DETECTED
N GONORRHOEA RRNA SPEC QL NAA+PROBE: NOT DETECTED
SOURCE TP AMPLIFICATION: NORMAL

## 2023-10-18 LAB — CYTOLOGY CVX/VAG DOC THIN PREP: NORMAL

## 2024-02-26 ENCOUNTER — APPOINTMENT (OUTPATIENT)
Dept: OBGYN | Facility: CLINIC | Age: 24
End: 2024-02-26
Payer: COMMERCIAL

## 2024-02-26 VITALS — DIASTOLIC BLOOD PRESSURE: 75 MMHG | SYSTOLIC BLOOD PRESSURE: 115 MMHG | WEIGHT: 164 LBS

## 2024-02-26 DIAGNOSIS — R68.82 DECREASED LIBIDO: ICD-10-CM

## 2024-02-26 PROCEDURE — 81025 URINE PREGNANCY TEST: CPT

## 2024-02-26 PROCEDURE — 81003 URINALYSIS AUTO W/O SCOPE: CPT | Mod: QW

## 2024-02-26 PROCEDURE — 36415 COLL VENOUS BLD VENIPUNCTURE: CPT

## 2024-02-26 PROCEDURE — 99213 OFFICE O/P EST LOW 20 MIN: CPT

## 2024-02-26 NOTE — HISTORY OF PRESENT ILLNESS
[FreeTextEntry1] : 22 y/o p1001 LMP 2/13/24 here for evaluation of low libio,  currenltly on Junel.  no other complaitns.  doesn't think related to Junel here for wwe. no abn bleeding, pain or discharge. on Junel 1/20.  nsd x 1, 7-12 lbs  mother ca breast age 36, patient brca neg  no med or surgical hx nkda non smoker

## 2024-02-26 NOTE — PLAN
[FreeTextEntry1] : 22 y/o p1 with low libido stable labs sent from office discussed changing b.c. preacutions rev time 20 min

## 2024-02-27 LAB
ALBUMIN SERPL ELPH-MCNC: 4.7 G/DL
ALP BLD-CCNC: 72 U/L
ALT SERPL-CCNC: 19 U/L
ANION GAP SERPL CALC-SCNC: 12 MMOL/L
AST SERPL-CCNC: 20 U/L
BILIRUB SERPL-MCNC: 0.3 MG/DL
BILIRUB UR QL STRIP: NORMAL
BUN SERPL-MCNC: 10 MG/DL
CALCIUM SERPL-MCNC: 9.8 MG/DL
CHLORIDE SERPL-SCNC: 104 MMOL/L
CO2 SERPL-SCNC: 26 MMOL/L
CREAT SERPL-MCNC: 0.85 MG/DL
EGFR: 99 ML/MIN/1.73M2
GLUCOSE SERPL-MCNC: 88 MG/DL
GLUCOSE UR-MCNC: NORMAL
HCG UR QL: 0.2 EU/DL
HCG UR QL: NEGATIVE
HCT VFR BLD CALC: 42.8 %
HGB BLD-MCNC: 14 G/DL
HGB UR QL STRIP.AUTO: NORMAL
KETONES UR-MCNC: NORMAL
LEUKOCYTE ESTERASE UR QL STRIP: NORMAL
MCHC RBC-ENTMCNC: 31.4 PG
MCHC RBC-ENTMCNC: 32.7 GM/DL
MCV RBC AUTO: 96 FL
NITRITE UR QL STRIP: NORMAL
PH UR STRIP: 7
PLATELET # BLD AUTO: 237 K/UL
POTASSIUM SERPL-SCNC: 4.3 MMOL/L
PROT SERPL-MCNC: 7.7 G/DL
PROT UR STRIP-MCNC: NORMAL
RBC # BLD: 4.46 M/UL
RBC # FLD: 11.9 %
SODIUM SERPL-SCNC: 142 MMOL/L
SP GR UR STRIP: 1.01
T4 FREE SERPL-MCNC: 1.2 NG/DL
TSH SERPL-ACNC: 1.43 UIU/ML
WBC # FLD AUTO: 8.38 K/UL

## 2024-02-29 ENCOUNTER — APPOINTMENT (OUTPATIENT)
Dept: OBGYN | Facility: CLINIC | Age: 24
End: 2024-02-29

## 2024-06-03 RX ORDER — NORETHINDRONE ACETATE AND ETHINYL ESTRADIOL 1; 20 MG/1; UG/1
1-20 TABLET ORAL DAILY
Qty: 3 | Refills: 1 | Status: ACTIVE | COMMUNITY
Start: 2023-11-16 | End: 1900-01-01

## 2024-12-05 RX ORDER — NORETHINDRONE ACETATE AND ETHINYL ESTRADIOL 1; 20 MG/1; UG/1
1-20 TABLET ORAL DAILY
Qty: 3 | Refills: 0 | Status: ACTIVE | COMMUNITY
Start: 2024-12-05 | End: 1900-01-01

## 2024-12-16 ENCOUNTER — NON-APPOINTMENT (OUTPATIENT)
Age: 24
End: 2024-12-16

## 2024-12-16 ENCOUNTER — APPOINTMENT (OUTPATIENT)
Dept: OBGYN | Facility: CLINIC | Age: 24
End: 2024-12-16
Payer: COMMERCIAL

## 2024-12-16 VITALS — HEART RATE: 78 BPM | SYSTOLIC BLOOD PRESSURE: 120 MMHG | WEIGHT: 148 LBS | DIASTOLIC BLOOD PRESSURE: 72 MMHG

## 2024-12-16 DIAGNOSIS — Z01.419 ENCOUNTER FOR GYNECOLOGICAL EXAMINATION (GENERAL) (ROUTINE) W/OUT ABNORMAL FINDINGS: ICD-10-CM

## 2024-12-16 DIAGNOSIS — Z78.9 OTHER SPECIFIED HEALTH STATUS: ICD-10-CM

## 2024-12-16 LAB
BILIRUB UR QL STRIP: NORMAL
GLUCOSE UR-MCNC: NORMAL
HCG UR QL: 0.2 EU/DL
HCG UR QL: NEGATIVE
HGB UR QL STRIP.AUTO: NORMAL
KETONES UR-MCNC: NORMAL
LEUKOCYTE ESTERASE UR QL STRIP: NORMAL
NITRITE UR QL STRIP: NORMAL
PH UR STRIP: 6.5
PROT UR STRIP-MCNC: NORMAL
SP GR UR STRIP: 1.01

## 2024-12-16 PROCEDURE — 99459 PELVIC EXAMINATION: CPT

## 2024-12-16 PROCEDURE — 99395 PREV VISIT EST AGE 18-39: CPT | Mod: 25

## 2024-12-16 PROCEDURE — 81025 URINE PREGNANCY TEST: CPT

## 2024-12-16 PROCEDURE — 81003 URINALYSIS AUTO W/O SCOPE: CPT | Mod: QW

## 2024-12-17 LAB
C TRACH RRNA SPEC QL NAA+PROBE: NOT DETECTED
N GONORRHOEA RRNA SPEC QL NAA+PROBE: NOT DETECTED
SOURCE AMPLIFICATION: NORMAL